# Patient Record
Sex: MALE | Race: WHITE | NOT HISPANIC OR LATINO | Employment: OTHER | URBAN - METROPOLITAN AREA
[De-identification: names, ages, dates, MRNs, and addresses within clinical notes are randomized per-mention and may not be internally consistent; named-entity substitution may affect disease eponyms.]

---

## 2019-09-11 ENCOUNTER — TELEPHONE (OUTPATIENT)
Dept: CARDIOLOGY | Facility: CLINIC | Age: 69
End: 2019-09-11

## 2019-09-11 NOTE — TELEPHONE ENCOUNTER
Returned call to Mr. Zuleta per message below.  Mr. Zuleta adv he is a prev pt of Dr. Kennedy & is looking to est care with new EP closer to home (lives in [place]).  Confirmed that Dr. Lawson does see pts in [place]; informed GP is in [place] some Fridays out of the month, not every Friday, just depends.  Mr. Song alfaro he is a Biotronik BI-V PPM & he hasn't had it checked in over a year; told Mr. Zuleta ppm will be checked prior to Dr. Lawson's apt; scheduled Dr. Lawson's apt on 10/4 for 1:40 but asked him to be there for 1:00 for ppm check prior to seeing GP; gave him exact address of apt. Asked if he recv'd records from Dr. Kennedy's office in mail yet, he hasn't- I will request records. Mr. Zuleta verbalized understanding to all.    In addition, Mr. Zuleta has gen. Cardiologist at [facility] & is hoping to find new gen cards closer to home, as well.  Adv Mr. Zuleta I will email him a list with some of the docs names (email address: [email or web address])

## 2019-09-11 NOTE — TELEPHONE ENCOUNTER
----- Message from Basia Galo sent at 9/11/2019 10:11 AM CDT -----  Contact: Patient  Type: Needs Medical Advice    Who Called:  Patient  Best Call Back Number: [phone]  Additional Information: Requesting a call back to schedule an appointment

## 2019-09-12 NOTE — TELEPHONE ENCOUNTER
After speaking with Sammie today, need to reschedule Mr. Zuleta's apt to the following week, 10/11/19 for 2:00 device check & Dr. Lawson's apt following (10/4/19 wouldn't have worked with fitting pt into device brunilda that day).     Adv Mrs. Pineda of new date to schedule pt; pt has been rescheduled to 10/11.    Lastly, called Mr. Zuleta to let him know about rescheduling apt; spoke with Mrs. Zuleta, explained situation to her, Mrs. Zuleta understood and said 10/11/19 for 2:00 is fine.  Adv apt is still at the same address/location I gave Mr. Zuleta yesterday.  Mrs. uZleta verbalized understanding to all & confirmed they will be there Oct. 11    (requested Dr. Kennedy's records today; waiting to receive)

## 2019-10-10 DIAGNOSIS — I48.91 ATRIAL FIBRILLATION, UNSPECIFIED TYPE: Primary | ICD-10-CM

## 2019-10-10 DIAGNOSIS — I48.91 ATRIAL FIBRILLATION, UNSPECIFIED TYPE: ICD-10-CM

## 2019-10-10 DIAGNOSIS — Z95.0 CARDIAC PACEMAKER IN SITU: Primary | ICD-10-CM

## 2019-10-10 PROBLEM — Z86.79 HISTORY OF CARDIOMYOPATHY: Status: ACTIVE | Noted: 2019-10-10

## 2019-10-10 PROBLEM — Z79.01 CURRENT USE OF LONG TERM ANTICOAGULATION: Status: ACTIVE | Noted: 2019-10-10

## 2019-10-10 PROBLEM — Z95.810 PRESENCE OF CARDIAC RESYNCHRONIZATION THERAPY DEFIBRILLATOR (CRT-D): Status: ACTIVE | Noted: 2019-10-10

## 2019-10-10 PROBLEM — Z95.810 IMPLANTABLE CARDIOVERTER-DEFIBRILLATOR (ICD) IN SITU: Status: ACTIVE | Noted: 2019-10-10

## 2019-10-10 PROBLEM — Z86.79 S/P MAZE OPERATION FOR ATRIAL FIBRILLATION: Status: ACTIVE | Noted: 2019-10-10

## 2019-10-10 PROBLEM — Z98.890 S/P MAZE OPERATION FOR ATRIAL FIBRILLATION: Status: ACTIVE | Noted: 2019-10-10

## 2019-10-10 PROBLEM — Z95.810 IMPLANTABLE CARDIOVERTER-DEFIBRILLATOR (ICD) IN SITU: Status: RESOLVED | Noted: 2019-10-10 | Resolved: 2019-10-10

## 2019-10-10 NOTE — PROGRESS NOTES
Subjective:     HPI    I had the pleasure of seeing Holden Zuleta in consultation at your request for the evaluation of atrial fibrillation. He is a former patient of Dr. Kennedy who is seeing me in the office for the first time today. He is a 69 year old male with a history of HTN, AV block status-post dual chamber pacemaker implantation in 9/2015, nonischemic cardiomyopathy (possibly due to chronic RV pacing) status-post RV lead extraction and upgrade to Biotronik CRT-D device in 7/2016, and atrial fibrillation status-post mini-Maze procedure in 2009 at [facility], and Christian-Maze at [facility] in 2012 (Dr. Davis).    Recent cardiac studies include an echo performed in 2/2019 showed an EF of 40-45%, mild LAE, and no significant valvular disease. A Lexiscan nuclear stress test done in 6/2017 showed no evidence of obstructive CAD. An echo performed in 4/2017 showed an EF of 30-35%.    I reviewed today's device interrogation, which shows stable device and lead function. Biv pacing 98%. There were two episodes of AF in 2019 (10/2019 and 5/2019), which lasted 12 minutes and 3 minutes, respectively. Estimated battery longevity 53%.    Mr. Zuleta describes fatigue, worse over the past 6 months.    My interpretation of today's ECG is A-BivP at 70 bpm.    Review of Systems   Constitution: Positive for malaise/fatigue. Negative for decreased appetite, weight gain and weight loss.   HENT: Negative for sore throat.    Eyes: Negative for blurred vision.   Cardiovascular: Positive for dyspnea on exertion. Negative for chest pain, irregular heartbeat, leg swelling, near-syncope, orthopnea, palpitations, paroxysmal nocturnal dyspnea and syncope.   Respiratory: Negative for shortness of breath.    Skin: Negative for rash.   Musculoskeletal: Negative for arthritis.   Gastrointestinal: Negative for abdominal pain.   Neurological: Positive for dizziness. Negative for focal weakness.   Psychiatric/Behavioral: Negative for altered mental  status.        Objective:    Physical Exam   Constitutional: He is oriented to person, place, and time. He appears well-developed and well-nourished. No distress.   HENT:   Head: Normocephalic and atraumatic.   Mouth/Throat: Oropharynx is clear and moist.   Eyes: Pupils are equal, round, and reactive to light. No scleral icterus.   Neck: Neck supple. No thyromegaly present.   Cardiovascular: Regular rhythm, normal heart sounds and normal pulses. Exam reveals no gallop and no friction rub.   No murmur heard.  Pulmonary/Chest: Effort normal and breath sounds normal. He has no rales.   Abdominal: Soft. Bowel sounds are normal. He exhibits no distension. There is no tenderness.   Musculoskeletal: He exhibits no edema.   Neurological: He is alert and oriented to person, place, and time.   Skin: Skin is warm and dry. No rash noted.   Psychiatric: He has a normal mood and affect. His behavior is normal.   Vitals reviewed.        Assessment:       1. Atrial fibrillation, unspecified type    2. Current use of long term anticoagulation    3. History of cardiomyopathy    4. Presence of cardiac resynchronization therapy defibrillator (CRT-D)--Biotronik    5. S/P Maze operation for atrial fibrillation         Plan:       In summary, Holden Zuleta is a 69 year old male with a history of paroxysmal atrial fibrillation status-post Christian Maze procedure, AV block status-post pacemaker implantation in 2015, and nonischemic cardiomyopathy status-post upgrade to Biotronik CRT-D in 7/2016. The device is functioning appropriately. The plan is for regular device checks, and to see me again in 1 year.    With regard to his AF, it is overall controlled with only two short-lived episodes over the past year. The plan is to hold the course.     Thank you for allowing me to participate in the care of this patient. Please do not hesitate to call me with any questions or concerns.

## 2019-10-11 ENCOUNTER — INITIAL CONSULT (OUTPATIENT)
Dept: CARDIOLOGY | Facility: CLINIC | Age: 69
End: 2019-10-11
Payer: MEDICARE

## 2019-10-11 ENCOUNTER — CLINICAL SUPPORT (OUTPATIENT)
Dept: CARDIOLOGY | Facility: CLINIC | Age: 69
End: 2019-10-11
Attending: INTERNAL MEDICINE
Payer: MEDICARE

## 2019-10-11 VITALS
BODY MASS INDEX: 32.48 KG/M2 | OXYGEN SATURATION: 95 % | SYSTOLIC BLOOD PRESSURE: 130 MMHG | HEART RATE: 78 BPM | DIASTOLIC BLOOD PRESSURE: 87 MMHG | WEIGHT: 266.75 LBS | HEIGHT: 76 IN

## 2019-10-11 DIAGNOSIS — Z86.79 HISTORY OF CARDIOMYOPATHY: ICD-10-CM

## 2019-10-11 DIAGNOSIS — Z98.890 S/P MAZE OPERATION FOR ATRIAL FIBRILLATION: ICD-10-CM

## 2019-10-11 DIAGNOSIS — Z86.79 S/P MAZE OPERATION FOR ATRIAL FIBRILLATION: ICD-10-CM

## 2019-10-11 DIAGNOSIS — Z79.01 CURRENT USE OF LONG TERM ANTICOAGULATION: ICD-10-CM

## 2019-10-11 DIAGNOSIS — I48.91 ATRIAL FIBRILLATION, UNSPECIFIED TYPE: ICD-10-CM

## 2019-10-11 DIAGNOSIS — Z95.810 PRESENCE OF CARDIAC RESYNCHRONIZATION THERAPY DEFIBRILLATOR (CRT-D): ICD-10-CM

## 2019-10-11 DIAGNOSIS — Z95.0 CARDIAC PACEMAKER IN SITU: ICD-10-CM

## 2019-10-11 PROCEDURE — 1101F PR PT FALLS ASSESS DOC 0-1 FALLS W/OUT INJ PAST YR: ICD-10-PCS | Mod: CPTII,S$GLB,, | Performed by: INTERNAL MEDICINE

## 2019-10-11 PROCEDURE — 93005 ELECTROCARDIOGRAM TRACING: CPT | Mod: S$GLB,,, | Performed by: INTERNAL MEDICINE

## 2019-10-11 PROCEDURE — 93010 ELECTROCARDIOGRAM REPORT: CPT | Mod: S$GLB,,, | Performed by: INTERNAL MEDICINE

## 2019-10-11 PROCEDURE — 99204 PR OFFICE/OUTPT VISIT, NEW, LEVL IV, 45-59 MIN: ICD-10-PCS | Mod: S$GLB,,, | Performed by: INTERNAL MEDICINE

## 2019-10-11 PROCEDURE — 99999 PR PBB SHADOW E&M-EST. PATIENT-LVL III: ICD-10-PCS | Mod: PBBFAC,,, | Performed by: INTERNAL MEDICINE

## 2019-10-11 PROCEDURE — 99999 PR PBB SHADOW E&M-EST. PATIENT-LVL III: CPT | Mod: PBBFAC,,, | Performed by: INTERNAL MEDICINE

## 2019-10-11 PROCEDURE — 99204 OFFICE O/P NEW MOD 45 MIN: CPT | Mod: S$GLB,,, | Performed by: INTERNAL MEDICINE

## 2019-10-11 PROCEDURE — 93005 EKG 12-LEAD: ICD-10-PCS | Mod: S$GLB,,, | Performed by: INTERNAL MEDICINE

## 2019-10-11 PROCEDURE — 1101F PT FALLS ASSESS-DOCD LE1/YR: CPT | Mod: CPTII,S$GLB,, | Performed by: INTERNAL MEDICINE

## 2019-10-11 PROCEDURE — 93010 EKG 12-LEAD: ICD-10-PCS | Mod: S$GLB,,, | Performed by: INTERNAL MEDICINE

## 2019-10-11 RX ORDER — METOPROLOL SUCCINATE 50 MG/1
50 TABLET, EXTENDED RELEASE ORAL DAILY
COMMUNITY
Start: 2016-07-19

## 2019-10-11 RX ORDER — SPIRONOLACTONE 25 MG/1
25 TABLET ORAL DAILY
COMMUNITY
Start: 2016-07-19

## 2021-11-30 ENCOUNTER — HOSPITAL ENCOUNTER (OUTPATIENT)
Dept: RADIOLOGY | Facility: HOSPITAL | Age: 71
Discharge: HOME | End: 2021-11-30
Attending: INTERNAL MEDICINE
Payer: MEDICARE

## 2021-11-30 DIAGNOSIS — R07.89 OTHER CHEST PAIN: ICD-10-CM

## 2021-11-30 PROCEDURE — 71046 X-RAY EXAM CHEST 2 VIEWS: CPT | Mod: TC,PO
